# Patient Record
Sex: MALE | Race: WHITE | NOT HISPANIC OR LATINO | Employment: FULL TIME | URBAN - METROPOLITAN AREA
[De-identification: names, ages, dates, MRNs, and addresses within clinical notes are randomized per-mention and may not be internally consistent; named-entity substitution may affect disease eponyms.]

---

## 2023-11-13 ENCOUNTER — HOSPITAL ENCOUNTER (OUTPATIENT)
Facility: MEDICAL CENTER | Age: 63
End: 2023-11-14
Attending: HOSPITALIST | Admitting: HOSPITALIST
Payer: COMMERCIAL

## 2023-11-13 ENCOUNTER — ANESTHESIA (OUTPATIENT)
Dept: SURGERY | Facility: MEDICAL CENTER | Age: 63
End: 2023-11-13
Payer: COMMERCIAL

## 2023-11-13 ENCOUNTER — APPOINTMENT (OUTPATIENT)
Dept: RADIOLOGY | Facility: MEDICAL CENTER | Age: 63
End: 2023-11-13
Attending: UROLOGY
Payer: COMMERCIAL

## 2023-11-13 ENCOUNTER — HOSPITAL ENCOUNTER (OUTPATIENT)
Dept: RADIOLOGY | Facility: MEDICAL CENTER | Age: 63
End: 2023-11-13

## 2023-11-13 ENCOUNTER — ANESTHESIA EVENT (OUTPATIENT)
Dept: SURGERY | Facility: MEDICAL CENTER | Age: 63
End: 2023-11-13
Payer: COMMERCIAL

## 2023-11-13 PROBLEM — E86.0 DEHYDRATION: Status: ACTIVE | Noted: 2023-11-13

## 2023-11-13 PROBLEM — N20.1 URETERIC STONE: Status: ACTIVE | Noted: 2023-11-13

## 2023-11-13 PROBLEM — E80.4 GILBERT SYNDROME: Status: ACTIVE | Noted: 2023-11-13

## 2023-11-13 PROBLEM — N17.9 ACUTE KIDNEY INJURY (HCC): Status: ACTIVE | Noted: 2023-11-13

## 2023-11-13 PROCEDURE — A9270 NON-COVERED ITEM OR SERVICE: HCPCS | Performed by: HOSPITALIST

## 2023-11-13 PROCEDURE — 160028 HCHG SURGERY MINUTES - 1ST 30 MINS LEVEL 3: Performed by: UROLOGY

## 2023-11-13 PROCEDURE — 160039 HCHG SURGERY MINUTES - EA ADDL 1 MIN LEVEL 3: Performed by: UROLOGY

## 2023-11-13 PROCEDURE — 700117 HCHG RX CONTRAST REV CODE 255: Performed by: UROLOGY

## 2023-11-13 PROCEDURE — 700105 HCHG RX REV CODE 258: Performed by: UROLOGY

## 2023-11-13 PROCEDURE — 700111 HCHG RX REV CODE 636 W/ 250 OVERRIDE (IP): Mod: JZ | Performed by: ANESTHESIOLOGY

## 2023-11-13 PROCEDURE — 160035 HCHG PACU - 1ST 60 MINS PHASE I: Performed by: UROLOGY

## 2023-11-13 PROCEDURE — C1758 CATHETER, URETERAL: HCPCS | Performed by: UROLOGY

## 2023-11-13 PROCEDURE — 88300 SURGICAL PATH GROSS: CPT

## 2023-11-13 PROCEDURE — C1769 GUIDE WIRE: HCPCS | Performed by: UROLOGY

## 2023-11-13 PROCEDURE — 94760 N-INVAS EAR/PLS OXIMETRY 1: CPT

## 2023-11-13 PROCEDURE — 82365 CALCULUS SPECTROSCOPY: CPT

## 2023-11-13 PROCEDURE — C2617 STENT, NON-COR, TEM W/O DEL: HCPCS | Performed by: UROLOGY

## 2023-11-13 PROCEDURE — C1894 INTRO/SHEATH, NON-LASER: HCPCS | Performed by: UROLOGY

## 2023-11-13 PROCEDURE — G0378 HOSPITAL OBSERVATION PER HR: HCPCS

## 2023-11-13 PROCEDURE — 160048 HCHG OR STATISTICAL LEVEL 1-5: Performed by: UROLOGY

## 2023-11-13 PROCEDURE — 74018 RADEX ABDOMEN 1 VIEW: CPT

## 2023-11-13 PROCEDURE — 110371 HCHG SHELL REV 272: Performed by: UROLOGY

## 2023-11-13 PROCEDURE — 700101 HCHG RX REV CODE 250: Performed by: ANESTHESIOLOGY

## 2023-11-13 PROCEDURE — 99222 1ST HOSP IP/OBS MODERATE 55: CPT | Performed by: HOSPITALIST

## 2023-11-13 PROCEDURE — 700111 HCHG RX REV CODE 636 W/ 250 OVERRIDE (IP): Performed by: HOSPITALIST

## 2023-11-13 PROCEDURE — 700102 HCHG RX REV CODE 250 W/ 637 OVERRIDE(OP): Performed by: HOSPITALIST

## 2023-11-13 PROCEDURE — 160002 HCHG RECOVERY MINUTES (STAT): Performed by: UROLOGY

## 2023-11-13 PROCEDURE — 700105 HCHG RX REV CODE 258: Performed by: HOSPITALIST

## 2023-11-13 PROCEDURE — 160009 HCHG ANES TIME/MIN: Performed by: UROLOGY

## 2023-11-13 DEVICE — STENT UROLOGICAL POLARIS 6X28  ULTRA: Type: IMPLANTABLE DEVICE | Site: URETER | Status: FUNCTIONAL

## 2023-11-13 RX ORDER — OXYCODONE HYDROCHLORIDE 5 MG/1
5 TABLET ORAL
Status: DISCONTINUED | OUTPATIENT
Start: 2023-11-13 | End: 2023-11-14 | Stop reason: HOSPADM

## 2023-11-13 RX ORDER — HYDROMORPHONE HYDROCHLORIDE 1 MG/ML
0.4 INJECTION, SOLUTION INTRAMUSCULAR; INTRAVENOUS; SUBCUTANEOUS
Status: DISCONTINUED | OUTPATIENT
Start: 2023-11-13 | End: 2023-11-13 | Stop reason: HOSPADM

## 2023-11-13 RX ORDER — SODIUM CHLORIDE 9 MG/ML
INJECTION, SOLUTION INTRAVENOUS CONTINUOUS
Status: DISCONTINUED | OUTPATIENT
Start: 2023-11-13 | End: 2023-11-14 | Stop reason: HOSPADM

## 2023-11-13 RX ORDER — BISACODYL 10 MG
10 SUPPOSITORY, RECTAL RECTAL
Status: DISCONTINUED | OUTPATIENT
Start: 2023-11-13 | End: 2023-11-14 | Stop reason: HOSPADM

## 2023-11-13 RX ORDER — EPHEDRINE SULFATE 50 MG/ML
5 INJECTION, SOLUTION INTRAVENOUS
Status: DISCONTINUED | OUTPATIENT
Start: 2023-11-13 | End: 2023-11-13 | Stop reason: HOSPADM

## 2023-11-13 RX ORDER — HYDROMORPHONE HYDROCHLORIDE 1 MG/ML
0.2 INJECTION, SOLUTION INTRAMUSCULAR; INTRAVENOUS; SUBCUTANEOUS
Status: DISCONTINUED | OUTPATIENT
Start: 2023-11-13 | End: 2023-11-13 | Stop reason: HOSPADM

## 2023-11-13 RX ORDER — EPHEDRINE SULFATE 50 MG/ML
INJECTION, SOLUTION INTRAVENOUS PRN
Status: DISCONTINUED | OUTPATIENT
Start: 2023-11-13 | End: 2023-11-13 | Stop reason: SURG

## 2023-11-13 RX ORDER — HALOPERIDOL 5 MG/ML
1 INJECTION INTRAMUSCULAR
Status: DISCONTINUED | OUTPATIENT
Start: 2023-11-13 | End: 2023-11-13 | Stop reason: HOSPADM

## 2023-11-13 RX ORDER — SODIUM CHLORIDE, SODIUM LACTATE, POTASSIUM CHLORIDE, CALCIUM CHLORIDE 600; 310; 30; 20 MG/100ML; MG/100ML; MG/100ML; MG/100ML
INJECTION, SOLUTION INTRAVENOUS CONTINUOUS
Status: DISCONTINUED | OUTPATIENT
Start: 2023-11-13 | End: 2023-11-13

## 2023-11-13 RX ORDER — SODIUM CHLORIDE, SODIUM LACTATE, POTASSIUM CHLORIDE, CALCIUM CHLORIDE 600; 310; 30; 20 MG/100ML; MG/100ML; MG/100ML; MG/100ML
INJECTION, SOLUTION INTRAVENOUS CONTINUOUS
Status: DISCONTINUED | OUTPATIENT
Start: 2023-11-13 | End: 2023-11-13 | Stop reason: HOSPADM

## 2023-11-13 RX ORDER — ONDANSETRON 2 MG/ML
4 INJECTION INTRAMUSCULAR; INTRAVENOUS EVERY 4 HOURS PRN
Status: DISCONTINUED | OUTPATIENT
Start: 2023-11-13 | End: 2023-11-14 | Stop reason: HOSPADM

## 2023-11-13 RX ORDER — ONDANSETRON 2 MG/ML
4 INJECTION INTRAMUSCULAR; INTRAVENOUS
Status: DISCONTINUED | OUTPATIENT
Start: 2023-11-13 | End: 2023-11-13 | Stop reason: HOSPADM

## 2023-11-13 RX ORDER — ACETAMINOPHEN 325 MG/1
650 TABLET ORAL EVERY 6 HOURS PRN
Status: DISCONTINUED | OUTPATIENT
Start: 2023-11-13 | End: 2023-11-14 | Stop reason: HOSPADM

## 2023-11-13 RX ORDER — IPRATROPIUM BROMIDE AND ALBUTEROL SULFATE 2.5; .5 MG/3ML; MG/3ML
3 SOLUTION RESPIRATORY (INHALATION)
Status: DISCONTINUED | OUTPATIENT
Start: 2023-11-13 | End: 2023-11-13 | Stop reason: HOSPADM

## 2023-11-13 RX ORDER — HYDROMORPHONE HYDROCHLORIDE 1 MG/ML
0.5 INJECTION, SOLUTION INTRAMUSCULAR; INTRAVENOUS; SUBCUTANEOUS
Status: DISCONTINUED | OUTPATIENT
Start: 2023-11-13 | End: 2023-11-14 | Stop reason: HOSPADM

## 2023-11-13 RX ORDER — MEPERIDINE HYDROCHLORIDE 25 MG/ML
12.5 INJECTION INTRAMUSCULAR; INTRAVENOUS; SUBCUTANEOUS
Status: DISCONTINUED | OUTPATIENT
Start: 2023-11-13 | End: 2023-11-13 | Stop reason: HOSPADM

## 2023-11-13 RX ORDER — POLYETHYLENE GLYCOL 3350 17 G/17G
1 POWDER, FOR SOLUTION ORAL
Status: DISCONTINUED | OUTPATIENT
Start: 2023-11-13 | End: 2023-11-14 | Stop reason: HOSPADM

## 2023-11-13 RX ORDER — ONDANSETRON 4 MG/1
4 TABLET, ORALLY DISINTEGRATING ORAL EVERY 4 HOURS PRN
Status: DISCONTINUED | OUTPATIENT
Start: 2023-11-13 | End: 2023-11-14 | Stop reason: HOSPADM

## 2023-11-13 RX ORDER — OXYCODONE HCL 5 MG/5 ML
10 SOLUTION, ORAL ORAL
Status: DISCONTINUED | OUTPATIENT
Start: 2023-11-13 | End: 2023-11-13 | Stop reason: HOSPADM

## 2023-11-13 RX ORDER — OXYCODONE HCL 5 MG/5 ML
5 SOLUTION, ORAL ORAL
Status: DISCONTINUED | OUTPATIENT
Start: 2023-11-13 | End: 2023-11-13 | Stop reason: HOSPADM

## 2023-11-13 RX ORDER — DIPHENHYDRAMINE HYDROCHLORIDE 50 MG/ML
12.5 INJECTION INTRAMUSCULAR; INTRAVENOUS
Status: DISCONTINUED | OUTPATIENT
Start: 2023-11-13 | End: 2023-11-13 | Stop reason: HOSPADM

## 2023-11-13 RX ORDER — OXYCODONE HYDROCHLORIDE 10 MG/1
10 TABLET ORAL
Status: DISCONTINUED | OUTPATIENT
Start: 2023-11-13 | End: 2023-11-14 | Stop reason: HOSPADM

## 2023-11-13 RX ORDER — PHENAZOPYRIDINE HYDROCHLORIDE 200 MG/1
200 TABLET, FILM COATED ORAL
Status: DISCONTINUED | OUTPATIENT
Start: 2023-11-14 | End: 2023-11-14 | Stop reason: HOSPADM

## 2023-11-13 RX ORDER — CEFAZOLIN SODIUM 1 G/3ML
INJECTION, POWDER, FOR SOLUTION INTRAMUSCULAR; INTRAVENOUS PRN
Status: DISCONTINUED | OUTPATIENT
Start: 2023-11-13 | End: 2023-11-13 | Stop reason: SURG

## 2023-11-13 RX ORDER — MIDAZOLAM HYDROCHLORIDE 1 MG/ML
INJECTION INTRAMUSCULAR; INTRAVENOUS PRN
Status: DISCONTINUED | OUTPATIENT
Start: 2023-11-13 | End: 2023-11-13 | Stop reason: SURG

## 2023-11-13 RX ORDER — DEXAMETHASONE SODIUM PHOSPHATE 4 MG/ML
INJECTION, SOLUTION INTRA-ARTICULAR; INTRALESIONAL; INTRAMUSCULAR; INTRAVENOUS; SOFT TISSUE PRN
Status: DISCONTINUED | OUTPATIENT
Start: 2023-11-13 | End: 2023-11-13 | Stop reason: SURG

## 2023-11-13 RX ORDER — DEXMEDETOMIDINE HYDROCHLORIDE 100 UG/ML
INJECTION, SOLUTION INTRAVENOUS PRN
Status: DISCONTINUED | OUTPATIENT
Start: 2023-11-13 | End: 2023-11-13 | Stop reason: SURG

## 2023-11-13 RX ORDER — LIDOCAINE HYDROCHLORIDE 20 MG/ML
INJECTION, SOLUTION EPIDURAL; INFILTRATION; INTRACAUDAL; PERINEURAL PRN
Status: DISCONTINUED | OUTPATIENT
Start: 2023-11-13 | End: 2023-11-13 | Stop reason: SURG

## 2023-11-13 RX ORDER — HYDRALAZINE HYDROCHLORIDE 20 MG/ML
5 INJECTION INTRAMUSCULAR; INTRAVENOUS
Status: DISCONTINUED | OUTPATIENT
Start: 2023-11-13 | End: 2023-11-13 | Stop reason: HOSPADM

## 2023-11-13 RX ORDER — AMOXICILLIN 250 MG
2 CAPSULE ORAL 2 TIMES DAILY
Status: DISCONTINUED | OUTPATIENT
Start: 2023-11-13 | End: 2023-11-14 | Stop reason: HOSPADM

## 2023-11-13 RX ORDER — HYDROMORPHONE HYDROCHLORIDE 1 MG/ML
0.1 INJECTION, SOLUTION INTRAMUSCULAR; INTRAVENOUS; SUBCUTANEOUS
Status: DISCONTINUED | OUTPATIENT
Start: 2023-11-13 | End: 2023-11-13 | Stop reason: HOSPADM

## 2023-11-13 RX ADMIN — DEXAMETHASONE SODIUM PHOSPHATE 8 MG: 4 INJECTION INTRA-ARTICULAR; INTRALESIONAL; INTRAMUSCULAR; INTRAVENOUS; SOFT TISSUE at 20:35

## 2023-11-13 RX ADMIN — DEXMEDETOMIDINE 25 MCG: 100 INJECTION, SOLUTION INTRAVENOUS at 20:38

## 2023-11-13 RX ADMIN — EPHEDRINE SULFATE 5 MG: 50 INJECTION INTRAVENOUS at 20:46

## 2023-11-13 RX ADMIN — SENNOSIDES AND DOCUSATE SODIUM 2 TABLET: 50; 8.6 TABLET ORAL at 22:33

## 2023-11-13 RX ADMIN — EPHEDRINE SULFATE 10 MG: 50 INJECTION INTRAVENOUS at 20:52

## 2023-11-13 RX ADMIN — CEFAZOLIN 2 G: 1 INJECTION, POWDER, FOR SOLUTION INTRAMUSCULAR; INTRAVENOUS at 20:27

## 2023-11-13 RX ADMIN — ONDANSETRON 4 MG: 2 INJECTION INTRAMUSCULAR; INTRAVENOUS at 21:03

## 2023-11-13 RX ADMIN — MIDAZOLAM 2 MG: 1 INJECTION, SOLUTION INTRAMUSCULAR; INTRAVENOUS at 20:23

## 2023-11-13 RX ADMIN — PROPOFOL 150 MG: 10 INJECTION, EMULSION INTRAVENOUS at 20:27

## 2023-11-13 RX ADMIN — SODIUM CHLORIDE, POTASSIUM CHLORIDE, SODIUM LACTATE AND CALCIUM CHLORIDE: 600; 310; 30; 20 INJECTION, SOLUTION INTRAVENOUS at 20:00

## 2023-11-13 RX ADMIN — LIDOCAINE HYDROCHLORIDE 80 MG: 20 INJECTION, SOLUTION EPIDURAL; INFILTRATION; INTRACAUDAL at 20:43

## 2023-11-13 RX ADMIN — FENTANYL CITRATE 50 MCG: 50 INJECTION, SOLUTION INTRAMUSCULAR; INTRAVENOUS at 20:33

## 2023-11-13 RX ADMIN — FENTANYL CITRATE 50 MCG: 50 INJECTION, SOLUTION INTRAMUSCULAR; INTRAVENOUS at 20:24

## 2023-11-13 RX ADMIN — EPHEDRINE SULFATE 10 MG: 50 INJECTION INTRAVENOUS at 20:58

## 2023-11-13 RX ADMIN — SODIUM CHLORIDE: 9 INJECTION, SOLUTION INTRAVENOUS at 22:37

## 2023-11-13 ASSESSMENT — LIFESTYLE VARIABLES
TOTAL SCORE: 0
EVER HAD A DRINK FIRST THING IN THE MORNING TO STEADY YOUR NERVES TO GET RID OF A HANGOVER: NO
HOW MANY TIMES IN THE PAST YEAR HAVE YOU HAD 5 OR MORE DRINKS IN A DAY: 0
TOTAL SCORE: 0
AVERAGE NUMBER OF DAYS PER WEEK YOU HAVE A DRINK CONTAINING ALCOHOL: 4
EVER FELT BAD OR GUILTY ABOUT YOUR DRINKING: NO
CONSUMPTION TOTAL: NEGATIVE
ON A TYPICAL DAY WHEN YOU DRINK ALCOHOL HOW MANY DRINKS DO YOU HAVE: 2
TOTAL SCORE: 0
HAVE YOU EVER FELT YOU SHOULD CUT DOWN ON YOUR DRINKING: NO
HAVE PEOPLE ANNOYED YOU BY CRITICIZING YOUR DRINKING: NO
ALCOHOL_USE: YES

## 2023-11-13 ASSESSMENT — COGNITIVE AND FUNCTIONAL STATUS - GENERAL
DAILY ACTIVITIY SCORE: 24
SUGGESTED CMS G CODE MODIFIER DAILY ACTIVITY: CH
MOBILITY SCORE: 24
SUGGESTED CMS G CODE MODIFIER MOBILITY: CH

## 2023-11-13 ASSESSMENT — ENCOUNTER SYMPTOMS
EYE REDNESS: 0
FOCAL WEAKNESS: 0
MYALGIAS: 0
NERVOUS/ANXIOUS: 0
FEVER: 0
STRIDOR: 0
COUGH: 0
SHORTNESS OF BREATH: 0
FLANK PAIN: 1
ABDOMINAL PAIN: 0
EYE DISCHARGE: 0
VOMITING: 0
CHILLS: 0
BRUISES/BLEEDS EASILY: 0

## 2023-11-13 ASSESSMENT — PATIENT HEALTH QUESTIONNAIRE - PHQ9
SUM OF ALL RESPONSES TO PHQ9 QUESTIONS 1 AND 2: 0
2. FEELING DOWN, DEPRESSED, IRRITABLE, OR HOPELESS: NOT AT ALL
1. LITTLE INTEREST OR PLEASURE IN DOING THINGS: NOT AT ALL

## 2023-11-13 ASSESSMENT — PAIN SCALES - GENERAL: PAIN_LEVEL: 0

## 2023-11-13 NOTE — PROGRESS NOTES
[facility] DIRECT ADMISSION REPORT  Transferring facility: [facility]    Transferring physician: Dr Pedersen     Chief complaint: Ureteric stone  Pertinent history & patient course: 63 years old male with no known significant past medical history presenting with obstructing ureteric stone, hydronephrosis and acute kidney injury.  Will require lithotripsy.  Creatinine 1.8.  Code Status: full per transferring provider, I personally verified with the transferring provider patient's code status and the transferring provider has confirmed this with the patient.    Patient accepted for transfer: Yes  Accepting [facility] Facility: [facility] - Nursing to notify the admitting provider when patient arrives to the unit.    Consultants to be called upon arrival: Urology  Admission status: Observation.   Floor requested: Medical    The admitting provider is the point of contact for questions or concerns regarding patient's care.

## 2023-11-14 VITALS
HEART RATE: 60 BPM | OXYGEN SATURATION: 92 % | DIASTOLIC BLOOD PRESSURE: 63 MMHG | TEMPERATURE: 96.9 F | BODY MASS INDEX: 30.69 KG/M2 | RESPIRATION RATE: 17 BRPM | SYSTOLIC BLOOD PRESSURE: 141 MMHG | HEIGHT: 77 IN | WEIGHT: 259.92 LBS

## 2023-11-14 PROBLEM — N20.1 URETERIC STONE: Status: RESOLVED | Noted: 2023-11-13 | Resolved: 2023-11-14

## 2023-11-14 LAB
ALBUMIN SERPL BCP-MCNC: 3.4 G/DL (ref 3.2–4.9)
ALBUMIN/GLOB SERPL: 1.5 G/DL
ALP SERPL-CCNC: 79 U/L (ref 30–99)
ALT SERPL-CCNC: 20 U/L (ref 2–50)
ANION GAP SERPL CALC-SCNC: 11 MMOL/L (ref 7–16)
AST SERPL-CCNC: 21 U/L (ref 12–45)
BILIRUB SERPL-MCNC: 2.7 MG/DL (ref 0.1–1.5)
BUN SERPL-MCNC: 18 MG/DL (ref 8–22)
CALCIUM ALBUM COR SERPL-MCNC: 8.7 MG/DL (ref 8.5–10.5)
CALCIUM SERPL-MCNC: 8.2 MG/DL (ref 8.4–10.2)
CHLORIDE SERPL-SCNC: 106 MMOL/L (ref 96–112)
CO2 SERPL-SCNC: 19 MMOL/L (ref 20–33)
CREAT SERPL-MCNC: 1.43 MG/DL (ref 0.5–1.4)
ERYTHROCYTE [DISTWIDTH] IN BLOOD BY AUTOMATED COUNT: 38.5 FL (ref 35.9–50)
GFR SERPLBLD CREATININE-BSD FMLA CKD-EPI: 54 ML/MIN/1.73 M 2
GLOBULIN SER CALC-MCNC: 2.3 G/DL (ref 1.9–3.5)
GLUCOSE SERPL-MCNC: 157 MG/DL (ref 65–99)
HCT VFR BLD AUTO: 35.5 % (ref 42–52)
HGB BLD-MCNC: 11.9 G/DL (ref 14–18)
MAGNESIUM SERPL-MCNC: 1.9 MG/DL (ref 1.5–2.5)
MCH RBC QN AUTO: 30.4 PG (ref 27–33)
MCHC RBC AUTO-ENTMCNC: 33.5 G/DL (ref 32.3–36.5)
MCV RBC AUTO: 90.6 FL (ref 81.4–97.8)
PATHOLOGY CONSULT NOTE: NORMAL
PLATELET # BLD AUTO: 120 K/UL (ref 164–446)
PMV BLD AUTO: 10.3 FL (ref 9–12.9)
POTASSIUM SERPL-SCNC: 4.4 MMOL/L (ref 3.6–5.5)
PROT SERPL-MCNC: 5.7 G/DL (ref 6–8.2)
RBC # BLD AUTO: 3.92 M/UL (ref 4.7–6.1)
SODIUM SERPL-SCNC: 136 MMOL/L (ref 135–145)
WBC # BLD AUTO: 5.7 K/UL (ref 4.8–10.8)

## 2023-11-14 PROCEDURE — A9270 NON-COVERED ITEM OR SERVICE: HCPCS | Performed by: HOSPITALIST

## 2023-11-14 PROCEDURE — 83735 ASSAY OF MAGNESIUM: CPT

## 2023-11-14 PROCEDURE — 94760 N-INVAS EAR/PLS OXIMETRY 1: CPT

## 2023-11-14 PROCEDURE — A9270 NON-COVERED ITEM OR SERVICE: HCPCS | Performed by: INTERNAL MEDICINE

## 2023-11-14 PROCEDURE — A9270 NON-COVERED ITEM OR SERVICE: HCPCS | Performed by: UROLOGY

## 2023-11-14 PROCEDURE — 700102 HCHG RX REV CODE 250 W/ 637 OVERRIDE(OP): Performed by: INTERNAL MEDICINE

## 2023-11-14 PROCEDURE — 700102 HCHG RX REV CODE 250 W/ 637 OVERRIDE(OP): Performed by: HOSPITALIST

## 2023-11-14 PROCEDURE — 99239 HOSP IP/OBS DSCHRG MGMT >30: CPT | Performed by: INTERNAL MEDICINE

## 2023-11-14 PROCEDURE — 80053 COMPREHEN METABOLIC PANEL: CPT

## 2023-11-14 PROCEDURE — 700102 HCHG RX REV CODE 250 W/ 637 OVERRIDE(OP): Performed by: UROLOGY

## 2023-11-14 PROCEDURE — G0378 HOSPITAL OBSERVATION PER HR: HCPCS

## 2023-11-14 PROCEDURE — 85027 COMPLETE CBC AUTOMATED: CPT

## 2023-11-14 PROCEDURE — 36415 COLL VENOUS BLD VENIPUNCTURE: CPT

## 2023-11-14 RX ORDER — POLYETHYLENE GLYCOL 3350 17 G/17G
1 POWDER, FOR SOLUTION ORAL ONCE
Status: COMPLETED | OUTPATIENT
Start: 2023-11-14 | End: 2023-11-14

## 2023-11-14 RX ADMIN — SENNOSIDES AND DOCUSATE SODIUM 2 TABLET: 50; 8.6 TABLET ORAL at 04:58

## 2023-11-14 RX ADMIN — POLYETHYLENE GLYCOL 3350 1 PACKET: 17 POWDER, FOR SOLUTION ORAL at 11:52

## 2023-11-14 RX ADMIN — PHENAZOPYRIDINE 200 MG: 200 TABLET ORAL at 11:52

## 2023-11-14 RX ADMIN — PHENAZOPYRIDINE 200 MG: 200 TABLET ORAL at 07:41

## 2023-11-14 ASSESSMENT — FIBROSIS 4 INDEX: FIB4 SCORE: 2.54

## 2023-11-14 NOTE — ASSESSMENT & PLAN NOTE
Mostly multifactorial due to obstructing ureteric stone, and dehydration   I will start intravenous fluids  Avoid / minimize nephrotoxins as much as possible.  Monitor inputs and outputs  Urology consulted, I discussed with Dr. Fuentes, plan for lithotripsy tonight.

## 2023-11-14 NOTE — OR NURSING
2122: Patient arrived to PACU from OR via bed. Report received from anesthesia and RN. Respirations are spontaneous and unlabored. VSS on 6L.     2125: Fuentes updating pt    2135: family updated    2152: pt meets criteria for transfer to room. Report called to receiving RN

## 2023-11-14 NOTE — CARE PLAN
The patient is Stable - Low risk of patient condition declining or worsening    Shift Goals  Clinical Goals: Get surgery done today  Patient Goals: Update with POC, Shower    Progress made toward(s) clinical / shift goals:  Pt underwent surgery this shift, updated with POC overnight.   Pt showered this smorning.      Patient is not progressing towards the following goals: n/a

## 2023-11-14 NOTE — PROGRESS NOTES
Pt arrived to [facility] from PACU via hospital bed at hrs 21:58. Pt is AO4, on O2 via oxymask at 3L.   VS taken-WNL.  No pain and nausea reported at this time. No signs of distress noted.

## 2023-11-14 NOTE — ANESTHESIA TIME REPORT
Anesthesia Start and Stop Event Times       Date Time Event    11/13/2023 2010 Ready for Procedure     2021 Anesthesia Start     2126 Anesthesia Stop          Responsible Staff  11/13/23      Name Role Begin End    Monica Márquez M.D. Anesth 2021 2126          Overtime Reason:  no overtime (within assigned shift)    Comments:

## 2023-11-14 NOTE — PROGRESS NOTES
"Pt Dc'ing home with wife. Messaged with Dr. Alejo (Urologist in [facility]) who stated: \"Rt UVJ 4 mm stone. 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 Admitted overnight due to travel issues.; hospitalist to DC in AM. Call for stone analysis in 3 weeks.\" This information communicated with Dr. Gould and patient. Instructions written on top of AVS. No questions or concerns voiced by patient. Leaving unit in WC with nursing student.   "

## 2023-11-14 NOTE — PROGRESS NOTES
Received bedside report from RN Ramos, pt seen ambulating room. MD at bedside. No signs of distress noted. Pt calm with unlabored breathing. Discussed POC, whiteboard updated.     19:26 Report given to PreOp Greyson ANTONIO.   21:46 Report received from PACU RN.

## 2023-11-14 NOTE — OP REPORT
[facility] Operative Report    Pre-operative Diagnosis: 1.  Right ureterovesical junction calculus   Post-operative Diagnosis: Same as above   Procedure: 1.  Right ureteroscopy with laser lithotripsy of ureteral calculus, basket stone extraction of fragments, and placement of 6 Slovak by 28 cm double-J ureteral stent with externalized string   Surgeon: Prakash Fuentes M.D., MD   Assistant: None   Anesthesia: General  Anesthesiologist: Monica Márquez M.D.    Estimated Blood Loss: Minimal   IV fluids Less than 1000 cc crystalloid   Specimens: 1.  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  He elected removal of a 4 mm right distal ureteral calculus    Procedure in Detail:  The patient was identified in the holding area, and taken to the operative suite.  Hewas positioned supine on the operating room table in the dorsal lithotomy position.  General anesthesia was administered by Dr. Márquez (Monica).  Cefazolin was given intravenously.  Timeout was called.  Correct patient and site of surgery was confirmed.    A 22 Slovak cystourethroscope was advanced through the urethral meatus into the urethra.  No stricturing was noted.  The prostate was enlarged with trilobar hypertrophy.  The bladder was entered.  Urine was drained.  The bladder was refilled.  Inspection of the bladder showed no foreign bodies tumors or stones.  Ureteral orifices were orthotopic.    The rightureter was unable to be cannulated  with a Sensor wire. I used a 6 Cameroonian open-ended catheter as a stiffener and advanced a zip wire proximal to the obstructing calculus.  I then advanced the open-ended catheter over this and performed a retrograde pyelogram outlining the mid and proximal ureter and renal collecting system.  I then advanced a sensor wire back through the open-ended catheter and coiled in the renal collecting system.  The radiopaque stone was noted in the distal ureter.      The ureteral orifice was dilated with a 11/13 x 36 cm navigator ureteral access sheath system, initially dilating with the obturator over the wire and then obturator with sheath..  I then accessed the ureteral orifice with a semirigid ureteroscope and advanced the scope alongside the indwelling Sensor safety wire up to the level of the stone.  This was a dark gray/black stone, presumably a calcium oxalate monohydrate stone.    Using a 365 µm  holmium laser fiber the stone was fragmented.  Energy settings were 0.8 J and 10 Hz for fragmentation.  This was a hard stone but I was able to fragment it.  Stone fragments were then collected with a nitinol 0 tip basket and deposited in the bladder and later collected and submitted for pathologic analysis.  Inspection of the ureter at the end of the lithotripsy revealed edema of the distal ureter where the stone had been impacted.  For this reason I elected to leave a ureteral stent to prevent swelling of the ureter and colic from that.    The Sensor guidewire was backloaded onto a cystoscope.  A 6 Cameroonian X 28 centimeters double-J ureteral stent was advanced over the wire with proximal coil formed in the renal collecting system and distal coil in the bladder.  A string was left attached, externalized, and taped to the patient's penis.    The bladder was emptied.  The bladder and urethra were filled with 2% lidocaine jelly.  The patient was sent to recovery room in stable condition.    Complications: None  noted    Disposition: Stable to PACU         Prakash Fuentes M.D.   [id number] [place] [id number] [phone]

## 2023-11-14 NOTE — OR NURSING
Pt allergies and NPO status verified. Home medications reconciled. Belongings secured. Pt verbalizes understanding of pain scale, expected course of stay and plan of care. Surgical site verified with pt. IV access assessed. All questions answered. Bed in low position. Call light within reach.

## 2023-11-14 NOTE — H&P
Hospital Medicine History & Physical Note    Date of Service  11/13/2023    Primary Care Physician  Devin Dudley P.A.-C.    Consultants  Urology, Dr. Fuentes    Code Status  Full Code    Chief Complaint  Obstructing ureteric stone      History of Presenting Illness  Epifanio Crane is a 63 y.o. male with no significant past medical history other than mildly elevated BMI of 31 who presented as a direct admission on 11/13/2023 with acute kidney injury secondary to obstructing right ureteric stone.  Pain is severe located in the right lower quadrant of the abdomen on the right flank.  No fevers or chills..       I discussed the plan of care with U nursing staff, urology on-call, Dr. Fuentes.    Review of Systems  Review of Systems   Constitutional:  Negative for chills and fever.   Eyes:  Negative for discharge and redness.   Respiratory:  Negative for cough, shortness of breath and stridor.    Cardiovascular:  Negative for chest pain and leg swelling.   Gastrointestinal:  Negative for abdominal pain and vomiting.   Genitourinary:  Positive for flank pain.   Musculoskeletal:  Negative for myalgias.   Skin: Negative.    Neurological:  Negative for focal weakness.   Endo/Heme/Allergies:  Does not bruise/bleed easily.   Psychiatric/Behavioral:  The patient is not nervous/anxious.      Past Medical History   has a past medical history of Acute kidney injury (HCC) (11/13/2023), Allergy, Arrhythmia, Arthritis, and Gilbert's syndrome.    Surgical History   has a past surgical history that includes knee arthroscopy (Left, 1976, 2007); knee arthroplasty total (Left, 8/22/2016); and knee arthroplasty total (Left, 12/14/2016).     Family History  family history includes Allergies in his mother; Bladder cancer in his mother; Cancer in his father; DVT in his mother; Heart Attack in his father; Hypertension in his father.      Social History   reports that he has never smoked. He has never used smokeless tobacco. He reports current  alcohol use. He reports that he does not use drugs.    Allergies  No Known Allergies    Medications  Prior to Admission Medications   Prescriptions Last Dose Informant Patient Reported? Taking?   acetaminophen (TYLENOL) 500 MG Tab Not Taking  No No   Sig: Take 2 Tabs by mouth every 8 hours.   Patient not taking: Reported on 11/13/2023   celecoxib (CELEBREX) 200 MG Cap Not Taking  No No   Sig: Take 1 Cap by mouth every day.   Patient not taking: Reported on 11/13/2023   cetirizine (ZYRTEC) 10 MG Tab 11/13/2023  Yes No   Sig: Take 10 mg by mouth every day.   docusate sodium (COLACE) 100 MG Cap Not Taking  No No   Sig: Take 1 Cap by mouth 2 times a day.   Patient not taking: Reported on 11/13/2023   oxycodone immediate-release (ROXICODONE) 5 MG Tab Not Taking  No No   Sig: Take 1-2 Tabs by mouth every four hours as needed.   Patient not taking: Reported on 11/13/2023      Facility-Administered Medications: None     Physical Exam  Temp:  [37.1 °C (98.8 °F)] 37.1 °C (98.8 °F)  Pulse:  [68] 68  Resp:  [16] 16  BP: (130)/(73) 130/73  SpO2:  [92 %] 92 %                        Physical Exam  Constitutional:       General: He is not in acute distress.     Appearance: He is not ill-appearing or diaphoretic.   HENT:      Head: Atraumatic.      Right Ear: External ear normal.      Left Ear: External ear normal.      Nose: No congestion or rhinorrhea.      Mouth/Throat:      Mouth: Mucous membranes are dry.   Eyes:      General: No scleral icterus.        Right eye: No discharge.         Left eye: No discharge.      Pupils: Pupils are equal, round, and reactive to light.   Cardiovascular:      Rate and Rhythm: Normal rate and regular rhythm.   Pulmonary:      Effort: Pulmonary effort is normal.   Abdominal:      General: There is no distension.      Tenderness: There is right CVA tenderness.   Musculoskeletal:      Cervical back: Neck supple. No rigidity. No muscular tenderness.      Right lower leg: No edema.      Left lower  "leg: No edema.   Skin:     General: Skin is dry.      Capillary Refill: Capillary refill takes 2 to 3 seconds.      Coloration: Skin is not jaundiced or pale.   Neurological:      Mental Status: He is alert and oriented to person, place, and time.      Coordination: Coordination normal.   Psychiatric:         Mood and Affect: Mood normal.         Behavior: Behavior normal.       Laboratory:          No results for input(s): \"ALTSGPT\", \"ASTSGOT\", \"ALKPHOSPHAT\", \"TBILIRUBIN\", \"DBILIRUBIN\", \"GAMMAGT\", \"AMYLASE\", \"LIPASE\", \"ALB\", \"PREALBUMIN\", \"GLUCOSE\" in the last 72 hours.      No results for input(s): \"NTPROBNP\" in the last 72 hours.      No results for input(s): \"TROPONINT\" in the last 72 hours.    Imaging:  OUTSIDE IMAGES-CT ABDOMEN /PELVIS   Final Result      OUTSIDE IMAGES-CT ABDOMEN /PELVIS   Final Result      US-FOREIGN FILM ULTRASOUND   Final Result      DX-PORTABLE FLUORO > 1 HOUR    (Results Pending)   [id number] VIEW    (Results Pending)     Assessment/Plan:  Justification for Admission Status  I anticipate this patient is appropriate for observation status at this time because likely discharge after 1 midnight    Patient will need a Med/Surg bed on MEDICAL service.  Patient has acute kidney injury and obstructing ureteric stone.    * Ureteric stone- (present on admission)  Assessment & Plan  Imaging show 4 mm obstructing stone on the right ureteropelvic junction  Urology consulted, I discussed with Dr. Fuentes, plan for lithotripsy tonight.  Multimodal pain control    Acute kidney injury (HCC)- (present on admission)  Assessment & Plan  Mostly multifactorial due to obstructing ureteric stone, and dehydration   I will start intravenous fluids  Avoid / minimize nephrotoxins as much as possible.  Monitor inputs and outputs  Urology consulted, I discussed with Dr. Fuentes, plan for lithotripsy tonight.    Dehydration- (present on admission)  Assessment & Plan  Likely secondary to reduced oral intake, n.p.o. " status for planned procedure  Encourage oral intake as tolerated, antiemetics as needed.  Intravenous hydration until adequate oral intake is achieved        VTE prophylaxis: SCDs/TEDs

## 2023-11-14 NOTE — PROGRESS NOTES
4 mm right ureteral stone  No evidence of UTI  Ongoing refractory pain    Pt elects removal of stone vs MET    Will proceed at his request

## 2023-11-14 NOTE — CARE PLAN
Problem: Knowledge Deficit - Standard  Goal: Patient and family/care givers will demonstrate understanding of plan of care, disease process/condition, diagnostic tests and medications  Outcome: Progressing     Problem: Neuro Status  Goal: Neuro status will remain stable or improve  Outcome: Progressing     Problem: Neurovascular Monitoring  Goal: Patient's neurovascular status will be maintained or improve  Outcome: Progressing   The patient is Stable - Low risk of patient condition declining or worsening    Shift Goals  Clinical Goals: DC home  Patient Goals: DC home  Family Goals: LUIS CARLOS    Progress made toward(s) clinical / shift goals:  AxOx4. VSS, afebrile. Denies pain and nausea. String from urethra steri stripped to leg, CDI. Pt reports burning while voiding, clear yellow and some drops of blood at end of stream. Ambulatory, tolerating diet. Eating and drinking well. IV burning, IVF s/l. MD notified OK to hold off on IVF now. Plan likely DC today.    Patient is not progressing towards the following goals:

## 2023-11-14 NOTE — ASSESSMENT & PLAN NOTE
Imaging show 4 mm obstructing stone on the right ureteropelvic junction  Urology consulted, I discussed with Dr. Fuentes, plan for lithotripsy tonight.  Multimodal pain control

## 2023-11-14 NOTE — DISCHARGE SUMMARY
Discharge Summary    CHIEF COMPLAINT ON ADMISSION  No chief complaint on file.      Reason for Admission  nephrolithiasis     Admission Date  11/13/2023    CODE STATUS  Full Code    HPI & HOSPITAL COURSE  Epifanio Crane is a 63 y.o. male admitted  from [facility] on 11/13/2023 where he presented with with severe right lower quadrant/flank CT imaging showed a 4 mm right UVJ stone. His creatinine was about 1.8.  No history of CKD.     On admission, urology were consulted.  Patient underwent right ureteroscopy with laser lithotripsy on 11/13/2023.  Creatinine is 1.43 today.      Evaluated by Urology. 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     Patient will follow up with outpatient Urology for stone analysis in a few weeks. Patient feels better. He is afebrile and hemodynamically stable.     Therefore, he is discharged in fair and stable condition to home with close outpatient follow-up.      Discharge Date  11/14/2023    FOLLOW UP ITEMS POST DISCHARGE  Urology in 1-2 weeks  PCP in 1-2 days    DISCHARGE DIAGNOSES  Principal Problem (Resolved):    Ureteric stone (POA: Yes)  Active Problems:    Acute kidney injury (HCC) (POA: Yes)    Dehydration (POA: Yes)    Gilbert syndrome (POA: Unknown)      FOLLOW UP  No future appointments.  No follow-up provider specified.    MEDICATIONS ON DISCHARGE     Medication List        CONTINUE taking these medications        Instructions   acetaminophen 500 MG Tabs  Commonly known as: Tylenol   Take 2 Tabs by mouth every 8 hours.  Dose: 1,000 mg     celecoxib 200 MG Caps  Commonly known as: CeleBREX   Take 1 Cap by mouth every day.  Dose: 200 mg     cetirizine 10 MG Tabs  Commonly known as: ZyrTEC   Take 10 mg by mouth every day.  Dose: 10 mg     docusate sodium 100 MG Caps  Commonly known as: Colace   Take 1 Cap by mouth 2 times a day.  Dose: 100 mg     oxyCODONE immediate-release 5 MG Tabs  Commonly known as: Roxicodone   Take 1-2 Tabs by  mouth every four hours as needed.  Dose: 5-10 mg              Allergies  No Known Allergies    DIET  Orders Placed This Encounter   Procedures    Diet Order Diet: Regular     Standing Status:   Standing     Number of Occurrences:   1     Order Specific Question:   Diet:     Answer:   Regular [1]       ACTIVITY  As tolerated.      CONSULTATIONS  Urology    PROCEDURES  Right ureteroscopy with laser lithotripsy    LABORATORY  Lab Results   Component Value Date    SODIUM 136 11/14/2023    POTASSIUM 4.4 11/14/2023    CHLORIDE 106 11/14/2023    CO2 19 (L) 11/14/2023    GLUCOSE 157 (H) 11/14/2023    BUN 18 11/14/2023    CREATININE 1.43 (H) 11/14/2023        Lab Results   Component Value Date    WBC 5.7 11/14/2023    HEMOGLOBIN 11.9 (L) 11/14/2023    HEMATOCRIT 35.5 (L) 11/14/2023    PLATELETCT 120 (L) 11/14/2023        Total time of the discharge process exceeds 38 minutes.

## 2023-11-14 NOTE — ASSESSMENT & PLAN NOTE
Likely secondary to reduced oral intake, n.p.o. status for planned procedure  Encourage oral intake as tolerated, antiemetics as needed.  Intravenous hydration until adequate oral intake is achieved

## 2023-11-14 NOTE — ANESTHESIA POSTPROCEDURE EVALUATION
Patient: Epifanio Crane    Procedure Summary       Date: 11/13/23 Room / Location: [place] [address] / SURGERY [facility]    Anesthesia Start: 2021 Anesthesia Stop: 2126    Procedures:       CYSTOSCOPY (Right: Ureter)      URETEROSCOPY (Right: Ureter)      LITHOTRIPSY, USING LASER (Right: Ureter)      STENT PLACEMENT (Right: Ureter) Diagnosis:       Right ureteral stone      (RIGHT URETERAL STONE)    Surgeons: Prakash Fuentes M.D. Responsible Provider: Monica Márquez M.D.    Anesthesia Type: general ASA Status: 2 - Emergent            Final Anesthesia Type: general  Last vitals  BP   Blood Pressure : 130/73    Temp   37.1 °C (98.8 °F)    Pulse   68   Resp   16    SpO2   92 %      Anesthesia Post Evaluation    Patient location during evaluation: PACU  Patient participation: complete - patient participated  Level of consciousness: awake  Pain score: 0    Airway patency: patent  Anesthetic complications: no  Cardiovascular status: hemodynamically stable  Respiratory status: acceptable  Hydration status: euvolemic    PONV: none          There were no known notable events for this encounter.

## 2023-11-14 NOTE — PROGRESS NOTES
4 Eyes Skin Assessment Completed by Iwona RN and Mildred RN.    Head WDL  Ears WDL  Nose WDL  Mouth WDL  Neck WDL  Breast/Chest Redness, Blanching, and Rash  Shoulder Blades WDL  Spine WDL  (R) Arm/Elbow/Hand WDL  (L) Arm/Elbow/Hand WDL  Abdomen WDL  Groin Bruising  Scrotum/Coccyx/Buttocks Rash Redness and Blanching- buttocks  (R) Leg Edema  (L) Leg Edema  (R) Heel/Foot/Toe Edema  (L) Heel/Foot/Toe Edema          Devices In Places Blood Pressure Cuff, Pulse Ox, and SCD's oxymask      Interventions In Place Pillows    Possible Skin Injury No    Pictures Uploaded Into Epic N/A  Wound Consult Placed N/A  RN Wound Prevention Protocol Ordered No

## 2023-11-14 NOTE — ANESTHESIA PREPROCEDURE EVALUATION
Case: [id number] Date/Time: 11/13/23 1945    Procedures:       CYSTOSCOPY      URETEROSCOPY      LITHOTRIPSY, USING LASER      STENT PLACEMENT    Location:  OR  / SURGERY [facility]    Surgeons: Prakash Fuentes M.D.            Relevant Problems      (positive) Acute kidney injury (HCC)      Other   (positive) Dehydration   (positive) Gilbert syndrome   (positive) Ureteric stone       Physical Exam    Airway   Mallampati: II  TM distance: >3 FB  Neck ROM: full       Cardiovascular - normal exam  Rhythm: regular  Rate: normal  (-) murmur     Dental - normal exam           Pulmonary - normal exam  Breath sounds clear to auscultation     Abdominal    Neurological - normal exam                   Anesthesia Plan    ASA 2- EMERGENT   ASA physical status emergent criteria: sepsis    Plan - general       Airway plan will be ETT                    Informed Consent:

## 2023-11-14 NOTE — CONSULTS
[facility] Consult Note    Primary Service: Hospitalist  Attending: Gildardo Chavez M.D.  Patient's Name/MRN: Epifanio Crane, [id number]    Admit Date:11/13/2023  Today's Date: 11/13/2023   Length of stay:  LOS: 0 days   Room #: SMPERIPOOL/NONE      Reason for consult/chief complaint: Right UVJ stone  ID/HPI: Epifanio Crane is a 63 y.o. male patient with a three day history of RLQ pain associated with nausea and vomiting, chills at onset, and urgency of urination. He was evaluated in the ER in [place] and was found to have a 4 mm right UVJ stone on CT scan. He has had ongoing pain and [place] [facility] [place]     He has no prior stones, no hx of UTI, no gross hematuria    He was bucked off his horse recently and developed bruising of his left proximal thigh. No hematuria       Past Medical History:   Past Medical History:   Diagnosis Date    Acute kidney injury (HCC) 11/13/2023    Allergy     Arrhythmia     states heart rate is always in the 40's    Arthritis     Gilbert's syndrome         Past Surgical History:   Past Surgical History:   Procedure Laterality Date    KNEE ARTHROPLASTY TOTAL Left 12/14/2016    Procedure: LEFT KNEE TIBIAL COMPONENT EXCHANGE;  Surgeon: Adams Carey M.D.;  Location: SURGERY [facility];  Service:     KNEE ARTHROPLASTY TOTAL Left 8/22/2016    Procedure: LEFT KNEE Partial KNEE REPLACEMENT ;  Surgeon: Adams Carey M.D.;  Location: SURGERY [facility];  Service:     KNEE ARTHROSCOPY Left 1976, 2007        Family History:   Family History   Problem Relation Age of Onset    Allergies Mother     Bladder cancer Mother     DVT Mother     Cancer Father     Heart Attack Father     Hypertension Father          Social History:   Social History     Tobacco Use    Smoking status: Never    Smokeless tobacco: Never   Substance Use Topics    Alcohol use: Yes     Comment: rarely    Drug use: No      Social History     Social History Narrative    Not on file        Allergies: he Patient  "has no known allergies.    Medications:   Medications Prior to Admission   Medication Sig Dispense Refill Last Dose    celecoxib (CELEBREX) 200 MG Cap Take 1 Cap by mouth every day. (Patient not taking: Reported on 11/13/2023) 30 Cap 0 Not Taking    oxycodone immediate-release (ROXICODONE) 5 MG Tab Take 1-2 Tabs by mouth every four hours as needed. (Patient not taking: Reported on 11/13/2023) 30 Tab 0 Not Taking    acetaminophen (TYLENOL) 500 MG Tab Take 2 Tabs by mouth every 8 hours. (Patient not taking: Reported on 11/13/2023) 60 Tab 0 Not Taking    docusate sodium (COLACE) 100 MG Cap Take 1 Cap by mouth 2 times a day. (Patient not taking: Reported on 11/13/2023) 60 Cap 0 Not Taking    cetirizine (ZYRTEC) 10 MG Tab Take 10 mg by mouth every day.   11/13/2023         Review of Systems  ROS     Physical Exam  VITAL SIGNS: /73   Pulse 68   Temp 37.1 °C (98.8 °F) (Temporal)   Resp 16   Ht 1.956 m (6' 5\")   Wt 120 kg (265 lb 3.4 oz)   SpO2 92%   BMI 31.45 kg/m²   Physical Exam   Healthy pleasant cooperative male, NAD  Resp: normal resp effort, no wheezing  Abd: soft  Gu: circ'd, testes descended  Ext: left proximal thigh contusion     Labs:              Glucose:  No results for input(s): \"GLUCOSE\" in the last 72 hours.  Coags:  No results for input(s): \"INR\" in the last 72 hours.      Urinalysis:   No results for input(s): \"COLORURINE\", \"CLARITY\", \"SPECGRAVITY\", \"PHURINE\", \"GLUCOSEUR\", \"KETONES\", \"NITRITE\", \"OCCULTBLOOD\", \"RBCURINE\", \"BACTERIA\", \"EPITHELCELL\" in the last 72 hours.    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 He has ongoing pain. He has work obligations with 1 of 2 partners having surgery and the other left to cover ObGyn services for his community. He would like to get the stone addressed and get back to work.     We did discuss the option of medical expulsive therapy/observation. He would like to proceed with removal of the stone.    Discussed possible need for stent. Unsuccessful removal with need for nephrostomy tube is possible as well.       His qsns have been answered.  He would like to proceed with removal of stone and consents to proceed.     Prakash Fuentes M.D.

## 2023-11-17 LAB
APPEARANCE STONE: NORMAL
COMPN STONE: NORMAL
SPECIMEN WT: 45 MG

## (undated) DEVICE — GOWN SURGICAL X-LARGE ULTRA - FILM-REINFORCED (20/CA)

## (undated) DEVICE — ADHESIVE MASTISOL - (48/BX)

## (undated) DEVICE — TUBE CONNECTING SUCTION - CLEAR PLASTIC STERILE 72 IN (50EA/CA)

## (undated) DEVICE — WATER IRRIG. STER 3000 ML - (4/CA)

## (undated) DEVICE — SODIUM CHL. IRRIGATION 0.9% 3000ML (4EA/CA 65CA/PF)

## (undated) DEVICE — CATHETER URET OPEN END 6FR (10EA/BX)

## (undated) DEVICE — PACK CYSTOSCOPY III - (8/CA)

## (undated) DEVICE — WIRE GUIDE SENSOR DUAL FLEX - 5/BX

## (undated) DEVICE — COVER FOOT UNIVERSAL DISP. - (25EA/CA)

## (undated) DEVICE — CLOSURE SKIN STRIP 1/2 X 4 IN - (STERI STRIP) (50/BX 4BX/CA)

## (undated) DEVICE — CATHETER URETHRAL OPEN END AXXCESS (10EA/BX)

## (undated) DEVICE — LASER FIBER HOLM 365 MICRON 100 WATT (5EA/BX)

## (undated) DEVICE — SODIUM CHL IRRIGATION 0.9% 1000ML (12EA/CA)

## (undated) DEVICE — GLOVE BIOGEL SZ 7.5 SURGICAL PF LTX - (50PR/BX 4BX/CA)

## (undated) DEVICE — DRAPE TABLE UROLOGY DRAINAGE (20EA/BX)

## (undated) DEVICE — SENSOR OXIMETER ADULT SPO2 RD SET (20EA/BX)

## (undated) DEVICE — SHEATH ACCESS URETERAL NAVIGATOR HD STAINLESS STEEL HYDROPHILIC L36 CM OD11-13 FR (1EA)

## (undated) DEVICE — ELECTRODE DUAL RETURN W/ CORD - (50/PK)

## (undated) DEVICE — TOWEL STOP TIMEOUT SAFETY FLAG (40EA/CA)

## (undated) DEVICE — ZIPWIRE .038 STRAIGHT BENTSON TIP (5EA/BX)

## (undated) DEVICE — CONTAINER SPECIMEN BAG OR - STERILE 4 OZ W/LID (100EA/CA)

## (undated) DEVICE — BASKET ZERO 1.9FR X 120CM

## (undated) DEVICE — WATER IRRIGATION STERILE 1000ML (12EA/CA)

## (undated) DEVICE — HUMID-VENT HEAT AND MOISTURE EXCHANGE- (50/BX)

## (undated) DEVICE — SET IRRIGATION CYSTOSCOPY Y-TYPE L81 IN (20EA/CA)

## (undated) DEVICE — SPONGE GAUZESTER 4 X 4 4PLY - (128PK/CA)

## (undated) DEVICE — GOWN SURGEONS X-LARGE - DISP. (30/CA)

## (undated) DEVICE — GOWN WARMING STANDARD FLEX - (30/CA)

## (undated) DEVICE — BAG SPONGE COUNT 10.25 X 32 - BLUE (250/CA)

## (undated) DEVICE — SET LEADWIRE 5 LEAD BEDSIDE DISPOSABLE ECG (1SET OF 5/EA)

## (undated) DEVICE — CANISTER SUCTION RIGID RED 1500CC (40EA/CA)

## (undated) DEVICE — SUCTION INSTRUMENT YANKAUER BULBOUS TIP W/O VENT (50EA/CA)

## (undated) DEVICE — SLEEVE, VASO, THIGH, MED

## (undated) DEVICE — JELLY SURGILUBE STERILE TUBE 4.25 OZ (1/EA)